# Patient Record
Sex: MALE | Race: WHITE | NOT HISPANIC OR LATINO | Employment: OTHER | URBAN - METROPOLITAN AREA
[De-identification: names, ages, dates, MRNs, and addresses within clinical notes are randomized per-mention and may not be internally consistent; named-entity substitution may affect disease eponyms.]

---

## 2023-03-03 ENCOUNTER — OFFICE VISIT (OUTPATIENT)
Dept: URBAN - METROPOLITAN AREA CLINIC 68 | Facility: CLINIC | Age: 73
End: 2023-03-03

## 2023-03-03 RX ORDER — DILTIAZEM HYDROCHLORIDE 60 MG/1
AS DIRECTED TABLET, FILM COATED ORAL
Status: ACTIVE | COMMUNITY

## 2023-03-03 RX ORDER — ACEBUTOLOL HYDROCHLORIDE 200 MG/1
1 CAPSULE CAPSULE ORAL ONCE A DAY
Status: ACTIVE | COMMUNITY

## 2023-03-03 RX ORDER — SOD SULF/POT CHLORIDE/MAG SULF 1.479 G
24 TABLETS AS DIRECTED TABLET ORAL ONCE
Qty: 24 TABLET | Refills: 0 | OUTPATIENT
Start: 2023-03-03 | End: 2023-03-04

## 2023-03-03 RX ORDER — ROSUVASTATIN CALCIUM 10 MG/1
1 TABLET TABLET, FILM COATED ORAL ONCE A DAY
Status: ACTIVE | COMMUNITY

## 2023-03-03 RX ORDER — RIVAROXABAN 20 MG/1
1 TABLET WITH FOOD TABLET, FILM COATED ORAL ONCE A DAY
Status: ACTIVE | COMMUNITY

## 2023-03-03 NOTE — HPI-MIGRATED HPI
General : prostatectomey 2012 of [place]  Eyad Tressa did barium swallow , found gastric acncer  gastrectomy 1 /22  atty from [place]  wife  Nahomy from a [facility] oncology  [facility] , [place] ,  prostate ca remnat psa above 0.2  work up rec radiation at [facility], stool positive blood ,  Last colonoscopy , virtual colonosopy last one 5 years

## 2023-03-24 ENCOUNTER — OFFICE VISIT (OUTPATIENT)
Dept: URBAN - METROPOLITAN AREA SURGERY CENTER 12 | Facility: SURGERY CENTER | Age: 73
End: 2023-03-24

## 2024-01-30 ENCOUNTER — OFFICE VISIT (OUTPATIENT)
Dept: URBAN - METROPOLITAN AREA TELEHEALTH 1 | Facility: TELEHEALTH | Age: 74
End: 2024-01-30
Payer: MEDICARE

## 2024-01-30 ENCOUNTER — TELEPHONE ENCOUNTER (OUTPATIENT)
Dept: URBAN - METROPOLITAN AREA CLINIC 68 | Facility: CLINIC | Age: 74
End: 2024-01-30

## 2024-01-30 ENCOUNTER — LAB OUTSIDE AN ENCOUNTER (OUTPATIENT)
Dept: URBAN - METROPOLITAN AREA TELEHEALTH 1 | Facility: TELEHEALTH | Age: 74
End: 2024-01-30

## 2024-01-30 VITALS — HEIGHT: 72 IN | WEIGHT: 139.2 LBS | BODY MASS INDEX: 18.85 KG/M2

## 2024-01-30 DIAGNOSIS — R13.10 DYSPHAGIA: ICD-10-CM

## 2024-01-30 PROCEDURE — 99214 OFFICE O/P EST MOD 30 MIN: CPT

## 2024-01-30 RX ORDER — RIVAROXABAN 20 MG/1
1 TABLET WITH FOOD TABLET, FILM COATED ORAL ONCE A DAY
Status: ACTIVE | COMMUNITY

## 2024-01-30 RX ORDER — ROSUVASTATIN CALCIUM 10 MG/1
1 TABLET TABLET, FILM COATED ORAL ONCE A DAY
Status: ACTIVE | COMMUNITY

## 2024-01-30 RX ORDER — DILTIAZEM HYDROCHLORIDE 60 MG/1
AS DIRECTED TABLET, FILM COATED ORAL
Status: ACTIVE | COMMUNITY

## 2024-01-30 RX ORDER — ACEBUTOLOL HYDROCHLORIDE 200 MG/1
1 CAPSULE CAPSULE ORAL ONCE A DAY
Status: ACTIVE | COMMUNITY

## 2024-01-30 NOTE — HPI-TODAY'S VISIT:
72 y/o male with hx  prostatectomey 2012 of CA presents today for EGD consultation. He has hx of gastrectomy 1/22 per Dr. Eyad Bustillos due to gastric cancer. He completed proton therapy combined with chemo until 11/2023, since then he has been experiencing weight loss and swallowing problems.  He feels as though things get stuck in his throat (mostly with solid foods and pills) and he gets a sore throat often. These symptoms began 2-4 weeks ago. He is currently taking lansoprazole 30 daily and very rarely experiences an episode of reflux. He is on Xarelto due to hx of afib. He denies nausea/vomiting, abdominal pain, melena, rectal bleeding, weight loss, fever.

## 2024-02-02 ENCOUNTER — LAB (OUTPATIENT)
Dept: URBAN - METROPOLITAN AREA CLINIC 4 | Facility: CLINIC | Age: 74
End: 2024-02-02
Payer: MEDICARE

## 2024-02-02 ENCOUNTER — EGD (OUTPATIENT)
Dept: URBAN - METROPOLITAN AREA SURGERY CENTER 12 | Facility: SURGERY CENTER | Age: 74
End: 2024-02-02
Payer: MEDICARE

## 2024-02-02 DIAGNOSIS — K22.2 ESOPHAGEAL OBSTRUCTION: ICD-10-CM

## 2024-02-02 DIAGNOSIS — K31.89 OTHER DISEASES OF STOMACH AND DUODENUM: ICD-10-CM

## 2024-02-02 DIAGNOSIS — C16.9 MALIGNANT NEOPLASM OF STOMACH, UNSPECIFIED: ICD-10-CM

## 2024-02-02 DIAGNOSIS — K22.89 OTHER SPECIFIED DISEASE OF ESOPHAGUS: ICD-10-CM

## 2024-02-02 PROCEDURE — 43249 ESOPH EGD DILATION <30 MM: CPT | Performed by: SPECIALIST

## 2024-02-02 PROCEDURE — 88305 TISSUE EXAM BY PATHOLOGIST: CPT | Performed by: PATHOLOGY

## 2024-02-02 PROCEDURE — 88313 SPECIAL STAINS GROUP 2: CPT | Performed by: PATHOLOGY

## 2024-02-02 PROCEDURE — 88342 IMHCHEM/IMCYTCHM 1ST ANTB: CPT | Performed by: PATHOLOGY

## 2024-02-02 PROCEDURE — 88341 IMHCHEM/IMCYTCHM EA ADD ANTB: CPT | Performed by: PATHOLOGY

## 2024-02-02 PROCEDURE — 43239 EGD BIOPSY SINGLE/MULTIPLE: CPT | Performed by: SPECIALIST

## 2024-02-02 RX ORDER — ACEBUTOLOL HYDROCHLORIDE 200 MG/1
1 CAPSULE CAPSULE ORAL ONCE A DAY
Status: ACTIVE | COMMUNITY

## 2024-02-02 RX ORDER — ROSUVASTATIN CALCIUM 10 MG/1
1 TABLET TABLET, FILM COATED ORAL ONCE A DAY
Status: ACTIVE | COMMUNITY

## 2024-02-02 RX ORDER — RIVAROXABAN 20 MG/1
1 TABLET WITH FOOD TABLET, FILM COATED ORAL ONCE A DAY
Status: ACTIVE | COMMUNITY

## 2024-02-02 RX ORDER — DILTIAZEM HYDROCHLORIDE 60 MG/1
AS DIRECTED TABLET, FILM COATED ORAL
Status: ACTIVE | COMMUNITY

## 2024-02-13 ENCOUNTER — OV EP (OUTPATIENT)
Dept: URBAN - METROPOLITAN AREA CLINIC 68 | Facility: CLINIC | Age: 74
End: 2024-02-13
Payer: MEDICARE

## 2024-02-13 VITALS
WEIGHT: 139.2 LBS | DIASTOLIC BLOOD PRESSURE: 72 MMHG | HEIGHT: 72 IN | SYSTOLIC BLOOD PRESSURE: 118 MMHG | BODY MASS INDEX: 18.85 KG/M2

## 2024-02-13 DIAGNOSIS — I48.91 A-FIB: ICD-10-CM

## 2024-02-13 DIAGNOSIS — C80.1 SIGNET RING CELL ADENOCARCINOMA: ICD-10-CM

## 2024-02-13 DIAGNOSIS — R13.10 DYSPHAGIA: ICD-10-CM

## 2024-02-13 PROBLEM — 722688002: Status: ACTIVE | Noted: 2024-02-13

## 2024-02-13 PROCEDURE — 99215 OFFICE O/P EST HI 40 MIN: CPT | Performed by: SPECIALIST

## 2024-02-13 RX ORDER — RIVAROXABAN 20 MG/1
1 TABLET WITH FOOD TABLET, FILM COATED ORAL ONCE A DAY
Status: ACTIVE | COMMUNITY

## 2024-02-13 RX ORDER — ACEBUTOLOL HYDROCHLORIDE 200 MG/1
1 CAPSULE CAPSULE ORAL ONCE A DAY
Status: ACTIVE | COMMUNITY

## 2024-02-13 RX ORDER — ROSUVASTATIN CALCIUM 10 MG/1
1 TABLET TABLET, FILM COATED ORAL ONCE A DAY
Status: ACTIVE | COMMUNITY

## 2024-02-13 RX ORDER — DILTIAZEM HYDROCHLORIDE 60 MG/1
AS DIRECTED TABLET, FILM COATED ORAL
Status: ACTIVE | COMMUNITY

## 2024-02-13 RX ORDER — SUCRALFATE 1 G/10ML
10 ML ON AN EMPTY STOMACH SUSPENSION ORAL TWICE A DAY
Qty: 600 ML | Refills: 11 | Status: ACTIVE | COMMUNITY
Start: 2024-02-05 | End: 2025-01-30

## 2024-02-13 NOTE — HPI-TODAY'S VISIT:
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 and second lesion in the gastric body Symptoms of dysphagia persist and are significant, can't tolerate solids, having nutrish, 500 malia nutritional snack and pastina Alot of oral secretion's getting worse   difficulty with pills Previously had stent which was removed, May ' 23  SP Chemo and proton radiation therapy 2023   IMPRESSION   malignant stenosis, needs a stent going for reeval c Dr Schulz and Ruth Simons at [facility] in 2 weeks Will ad  budesonide bid  Consider admit and inpt IVs and stent here at [facility] if fails   prostatectomy 2012 of [place]        Eyad Bustillos did barium swallow, found gastric cancer        gastrectomy 1 /22        atty from [place]        wife Sameera Corea from [facility], [place],        prostate ca remnant psa above 0.2        work up rec radiation at [place], stool positive blood,        Last colonoscopy, virtual colonoscopy last one 5 years hx gastric cancer. sneezes if eats too quickly proton therapy combined with chemo until 11/2023, weight loss and swallowing problems feels like things get stuck in throat and gets sore throat has sore throat in the mornings and headache mostly just with solid foods and pills started 2-4 weeks ago lansoprazole 30 daily not often gerd afib xarelto sp gastrectomy